# Patient Record
Sex: FEMALE | URBAN - METROPOLITAN AREA
[De-identification: names, ages, dates, MRNs, and addresses within clinical notes are randomized per-mention and may not be internally consistent; named-entity substitution may affect disease eponyms.]

---

## 2021-02-25 ENCOUNTER — WEB ENCOUNTER (OUTPATIENT)
Dept: URBAN - METROPOLITAN AREA CLINIC 113 | Facility: CLINIC | Age: 41
End: 2021-02-25

## 2021-02-25 ENCOUNTER — DASHBOARD ENCOUNTERS (OUTPATIENT)
Age: 41
End: 2021-02-25

## 2021-02-25 ENCOUNTER — OFFICE VISIT (OUTPATIENT)
Dept: URBAN - METROPOLITAN AREA CLINIC 113 | Facility: CLINIC | Age: 41
End: 2021-02-25
Payer: COMMERCIAL

## 2021-02-25 VITALS
SYSTOLIC BLOOD PRESSURE: 108 MMHG | HEIGHT: 67 IN | TEMPERATURE: 97.8 F | BODY MASS INDEX: 26.68 KG/M2 | HEART RATE: 92 BPM | DIASTOLIC BLOOD PRESSURE: 76 MMHG | WEIGHT: 170 LBS

## 2021-02-25 DIAGNOSIS — R10.31 RLQ ABDOMINAL PAIN: ICD-10-CM

## 2021-02-25 DIAGNOSIS — K59.00 CONSTIPATION, UNSPECIFIED CONSTIPATION TYPE: ICD-10-CM

## 2021-02-25 PROBLEM — 14760008: Status: ACTIVE | Noted: 2021-02-25

## 2021-02-25 PROCEDURE — 99204 OFFICE O/P NEW MOD 45 MIN: CPT | Performed by: NURSE PRACTITIONER

## 2021-02-25 RX ORDER — DICYCLOMINE HYDROCHLORIDE 10 MG/1
1 CAPSULE CAPSULE ORAL
Qty: 60 | Refills: 1 | OUTPATIENT
Start: 2021-02-25 | End: 2021-04-26

## 2021-02-25 NOTE — HPI-OTHER HISTORIES
40-year-old woman presenting for evaluation of abdominal pain.  In 2019, she underwent total abdominal hysterectomy for cervical cancer. She presented to the ED at [facility] a few days after hysterectomy for acute abdominal pain with exquisite tenderness noted on examination.  CT of the abdomen and pelvis revealed right-sided ascites, and inflammation of the terminal ileum.  A CT-guided aspiration of the fluid was performed, and no injury to the GI system or gallbladder was appreciated nor any explanation for pain or peritonitis. She continued to improve, and was discharged home on antibiotics. She continues to follow with oncology, [facility] (GYN/ONC) at [place].  She has had persistent right lower abdominal pain since her hysterectomy. At times the pain is more of a discomfort. Other times, the pain is more significant. The pain in unchanged with meals or defecation. The pain is described as sharp. No weight loss, nausea or vomiting. No blood per rectum. She has bowel movements approximately every 3 to 4 days. She is not currently on any bowel regimen. She denies any recent abdominal imaging. She occasionally will take Tylenol for the pain with little improvement. Use of a heating pad does provide relief.

## 2021-04-08 ENCOUNTER — OFFICE VISIT (OUTPATIENT)
Dept: URBAN - METROPOLITAN AREA CLINIC 113 | Facility: CLINIC | Age: 41
End: 2021-04-08

## 2021-04-08 RX ORDER — DICYCLOMINE HYDROCHLORIDE 10 MG/1
1 CAPSULE CAPSULE ORAL
Qty: 60 | Refills: 1 | Status: ACTIVE | COMMUNITY
Start: 2021-02-25 | End: 2021-04-26

## 2021-04-08 NOTE — HPI-TODAY'S VISIT:
40-year-old woman with a history of total abdominal hysterectomy and bilateral salpingo-oophorectomy for cervical cancer in 2019, presenting for follow-up regarding right lower quadrant abdominal pain. She was last seen in the office on 2/25/2021 with complaints of right lower quadrant pain ongoing intermittently for a year or more following total abdominal hysterectomy and bilateral salpingo-oophorectomy.  There was mild discomfort in the right lower abdomen.  Otherwise normal abdominal exam.  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  She did admit constipation predominant bowel habits is having bowels moving every 3 or 4 days.  She was to try MiraLAX daily for regulation of bowel movements as well as an antispasmodic as needed.  A CT of the abdomen and pelvis with contrast was ordered to rule out intra abdominal pathology.